# Patient Record
Sex: MALE
[De-identification: names, ages, dates, MRNs, and addresses within clinical notes are randomized per-mention and may not be internally consistent; named-entity substitution may affect disease eponyms.]

---

## 2017-10-17 ENCOUNTER — RX ONLY (OUTPATIENT)
Age: 52
Setting detail: RX ONLY
End: 2017-10-17

## 2017-10-17 ENCOUNTER — APPOINTMENT (RX ONLY)
Dept: URBAN - METROPOLITAN AREA OTHER 12 | Facility: OTHER | Age: 52
Setting detail: DERMATOLOGY
End: 2017-10-17

## 2017-10-17 DIAGNOSIS — L30.9 DERMATITIS, UNSPECIFIED: ICD-10-CM

## 2017-10-17 DIAGNOSIS — H00.01 HORDEOLUM EXTERNUM: ICD-10-CM

## 2017-10-17 PROBLEM — H00.012 HORDEOLUM EXTERNUM RIGHT LOWER EYELID: Status: ACTIVE | Noted: 2017-10-17

## 2017-10-17 PROCEDURE — ? TREATMENT REGIMEN

## 2017-10-17 PROCEDURE — ? COUNSELING

## 2017-10-17 PROCEDURE — 99202 OFFICE O/P NEW SF 15 MIN: CPT

## 2017-10-17 PROCEDURE — ? PRESCRIPTION

## 2017-10-17 RX ORDER — KETOCONAZOLE 20.5 MG/ML
SHAMPOO, SUSPENSION TOPICAL
Qty: 1 | Refills: 2 | Status: CANCELLED
Stop reason: CLARIF

## 2017-10-17 RX ORDER — MINOCYCLINE HYDROCHLORIDE 100 MG/1
CAPSULE ORAL
Qty: 84 | Refills: 0 | Status: ERX

## 2017-10-17 RX ORDER — SULFACETAMIDE SODIUM AND SULFUR 10; 5 MG/G; MG/G
RINSE TOPICAL
Qty: 1 | Refills: 5 | Status: ERX

## 2017-10-17 ASSESSMENT — LOCATION ZONE DERM
LOCATION ZONE: NECK
LOCATION ZONE: FACE
LOCATION ZONE: EYELID

## 2017-10-17 ASSESSMENT — LOCATION SIMPLE DESCRIPTION DERM
LOCATION SIMPLE: LEFT ANTERIOR NECK
LOCATION SIMPLE: RIGHT INFERIOR EYELID
LOCATION SIMPLE: RIGHT CHEEK

## 2017-10-17 ASSESSMENT — LOCATION DETAILED DESCRIPTION DERM
LOCATION DETAILED: RIGHT LATERAL INFERIOR EYELID
LOCATION DETAILED: RIGHT INFERIOR CENTRAL MALAR CHEEK
LOCATION DETAILED: LEFT SUPERIOR ANTERIOR NECK

## 2017-10-17 NOTE — HPI: RASH
How Severe Is Your Rash?: moderate
Is This A New Presentation, Or A Follow-Up?: Rash
Additional History: Patient denies any allergies or changing eating habits.

## 2017-10-17 NOTE — PROCEDURE: TREATMENT REGIMEN
HISTORY OF PRESENT ILLNESS    New patient to my practice, referred to me by his wife. Prior medical care has been from Dr. Maritza Hoff. he works as a Retired . his  past medical history was reviewed, discussed, and summarized in the list below. Reports frontal and occipital headaches for months. Left ear popping. Improves when sitting. Hypertension- took meds in the past.  Not in 3 years. Home -160/80-90  Hypertension ROS: taking medications as instructed, no medication side effects noted, no TIA's, no chest pain on exertion, no dyspnea on exertion, no swelling of ankles     reports that he has quit smoking. He does not have any smokeless tobacco history on file. reports that he drinks alcohol. BP Readings from Last 2 Encounters:   02/20/17 (!) 160/102   01/25/12 144/82       Review of Systems   All other systems reviewed and are negative, except as noted in HPI      Past Medical and Surgical History  Past Medical History   Diagnosis Date    Arthritis     Benign essential HTN      took medication    GERD (gastroesophageal reflux disease)     Hernia     Iron deficiency anemia      Dr. Ant Thomas. 2014. on Xarelto. did iron infusions.  Pulmonary emboli [facility]) 2014     Dr. Marcelino Head. Xarelto caused anemia        has a past surgical history that includes orthopaedic; colonoscopy (2014); and endoscopy (2014). Current Outpatient Prescriptions   Medication Sig    ASPIRIN (ASPIR-81 PO) Take  by mouth.  FERROUS SULFATE (IRON PO) Take  by mouth.  omeprazole (PRILOSEC OTC) 20 mg tablet Take 20 mg by mouth daily. No current facility-administered medications for this visit. reports that he has quit smoking. He does not have any smokeless tobacco history on file. He reports that he drinks alcohol.    family history includes Cancer in his brother and sister. Physical Exam   Nursing note and vitals reviewed.   Blood pressure (!) 160/102, pulse (!) 103, temperature 98.3 °F (36.8
°C), temperature source Oral, resp. rate 12, height 6' 4.5\" (1.943 m), weight 266 lb 12.8 oz (121 kg), SpO2 97 %. Constitutional: oriented to person, place, and time. No distress. Eyes: Conjunctivae are normal.   HEENT:  No cervical lymphadenopathy. No thyroid nodules or goiter  Cardiovascular: Normal rate. Regular rhythm, no murmurs, rubs. No edema  Pulmonary/Chest: Effort normal. clear to auscultation  Abdominal: soft, non-tender, non-distended  Musculoskeletal:     Neurological: Alert and oriented to person, place. Cranial nerves grossly intact. Normal gait   Skin: No rash noted. Psychiatric: Normal mood and affect. Behavior is normal.     ASSESSMENT and PLAN  Lorri Oconnell was seen today for establish care. Diagnoses and all orders for this visit:    Benign essential HTN- uncontrolled and untreated. Recommend med therapy. DASH Diet.  -     Start lisinopril (PRINIVIL, ZESTRIL) 10 mg tablet; Take 1 Tab by mouth daily.  -     CBC W/O DIFF  -     METABOLIC PANEL, COMPREHENSIVE  ACE inhibitor side effects discussed. Dry cough is common and will resolved after stopping medication. Educated of symptoms of angioedema. Stop medication immediately and seek medical attention. Neck pain - Stretching exercises demonstrated for for this condition    Frontal headache - \"pressure\". Mild. Perhaps due to HTN. Return to clinic for further evaluation if new symptoms develop or if current symptoms worsen or fail to resolve. Other orders  -     PREVNAR 13, PF, 0.5 mL syrg injection; 0.5 mL by IntraMUSCular route once for 1 dose.  PLEASE HAVE THIS AT YOUR LOCAL PHARMACY  Indications: PREVENTION OF STREPTOCOCCUS PNEUMONIAE INFECTION        lab results and schedule of future lab studies reviewed with patient  reviewed medications and side effects in detail
Detail Level: Simple
Discontinue Regimen: Topical steroid cream to face
Initiate Treatment: Ketoconazole and Minocycline

## 2018-02-05 ENCOUNTER — RX ONLY (OUTPATIENT)
Age: 53
Setting detail: RX ONLY
End: 2018-02-05

## 2018-02-05 ENCOUNTER — APPOINTMENT (RX ONLY)
Dept: URBAN - METROPOLITAN AREA OTHER 12 | Facility: OTHER | Age: 53
Setting detail: DERMATOLOGY
End: 2018-02-05

## 2018-02-05 DIAGNOSIS — L71.8 OTHER ROSACEA: ICD-10-CM

## 2018-02-05 PROCEDURE — ? PRESCRIPTION

## 2018-02-05 PROCEDURE — 99213 OFFICE O/P EST LOW 20 MIN: CPT

## 2018-02-05 PROCEDURE — ? TREATMENT REGIMEN

## 2018-02-05 PROCEDURE — ? COUNSELING

## 2018-02-05 RX ORDER — IVERMECTIN 10 MG/G
CREAM TOPICAL
Qty: 1 | Refills: 5 | Status: ERX | COMMUNITY
Start: 2018-02-05

## 2018-02-05 RX ORDER — SULFACETAMIDE SODIUM AND SULFUR 10; 5 MG/G; MG/G
RINSE TOPICAL
Qty: 1 | Refills: 5 | Status: CANCELLED
Stop reason: SDUPTHER

## 2018-02-05 RX ORDER — DOXYCYCLINE 40 MG/1
CAPSULE ORAL
Qty: 30 | Refills: 5 | Status: ERX | COMMUNITY
Start: 2018-02-05

## 2018-02-05 RX ADMIN — IVERMECTIN: 10 CREAM TOPICAL at 00:00

## 2018-02-05 RX ADMIN — DOXYCYCLINE: 40 CAPSULE ORAL at 00:00

## 2018-02-05 ASSESSMENT — LOCATION ZONE DERM: LOCATION ZONE: FACE

## 2018-02-05 ASSESSMENT — LOCATION SIMPLE DESCRIPTION DERM
LOCATION SIMPLE: LEFT CHEEK
LOCATION SIMPLE: RIGHT CHEEK

## 2018-02-05 ASSESSMENT — LOCATION DETAILED DESCRIPTION DERM
LOCATION DETAILED: RIGHT CENTRAL MALAR CHEEK
LOCATION DETAILED: LEFT INFERIOR CENTRAL MALAR CHEEK

## 2018-02-05 NOTE — HPI: RASH
How Severe Is Your Rash?: mild
Is This A New Presentation, Or A Follow-Up?: Follow Up Rash
Additional History: The patient states the rash did not clear up minocycline and sulfur wash. He does not get flushing or gritty eyes regularly.

## 2018-02-05 NOTE — PROCEDURE: TREATMENT REGIMEN
Initiate Treatment: Soolantra 1%: apply to face once daily\\ndoxycycline 40 mg QD
Continue Regimen: Sulfacetamide sulfur wash QD
Detail Level: Zone

## 2018-06-08 ENCOUNTER — APPOINTMENT (RX ONLY)
Dept: URBAN - METROPOLITAN AREA OTHER 12 | Facility: OTHER | Age: 53
Setting detail: DERMATOLOGY
End: 2018-06-08

## 2018-06-08 DIAGNOSIS — L71.8 OTHER ROSACEA: ICD-10-CM

## 2018-06-08 PROCEDURE — 99212 OFFICE O/P EST SF 10 MIN: CPT

## 2018-06-08 PROCEDURE — ? PRESCRIPTION

## 2018-06-08 PROCEDURE — ? COUNSELING

## 2018-06-08 PROCEDURE — ? TREATMENT REGIMEN

## 2018-06-08 RX ORDER — MINOCYCLINE HYDROCHLORIDE 90 MG/1
CAPSULE, EXTENDED RELEASE ORAL
Qty: 30 | Refills: 1 | Status: ERX | COMMUNITY
Start: 2018-06-08

## 2018-06-08 RX ADMIN — MINOCYCLINE HYDROCHLORIDE: 90 CAPSULE, EXTENDED RELEASE ORAL at 00:00

## 2018-06-08 ASSESSMENT — LOCATION DETAILED DESCRIPTION DERM
LOCATION DETAILED: LEFT CENTRAL MALAR CHEEK
LOCATION DETAILED: RIGHT MEDIAL MALAR CHEEK

## 2018-06-08 ASSESSMENT — LOCATION SIMPLE DESCRIPTION DERM
LOCATION SIMPLE: RIGHT CHEEK
LOCATION SIMPLE: LEFT CHEEK

## 2018-06-08 ASSESSMENT — LOCATION ZONE DERM: LOCATION ZONE: FACE

## 2018-06-08 NOTE — PROCEDURE: TREATMENT REGIMEN
Detail Level: Simple
Continue Regimen: Soolantra cream daily
Otc Regimen: Cerave cleanser  or plain dove soap twice daily
Discontinue Regimen: Rosacea 40mg and all Loofahs
Plan: Discussed isotretinoin, Minocycline and doxycycline- consider starting Accutane at follow up if no improvement with Minocycline.\\n\\nGenRx information provided to patient

## 2018-08-09 ENCOUNTER — APPOINTMENT (RX ONLY)
Dept: URBAN - METROPOLITAN AREA OTHER 12 | Facility: OTHER | Age: 53
Setting detail: DERMATOLOGY
End: 2018-08-09

## 2018-08-09 DIAGNOSIS — L70.8 OTHER ACNE: ICD-10-CM

## 2018-08-09 DIAGNOSIS — L71.8 OTHER ROSACEA: ICD-10-CM

## 2018-08-09 PROCEDURE — ? COUNSELING

## 2018-08-09 PROCEDURE — ? TREATMENT REGIMEN

## 2018-08-09 PROCEDURE — ? PRESCRIPTION

## 2018-08-09 PROCEDURE — 99212 OFFICE O/P EST SF 10 MIN: CPT

## 2018-08-09 RX ORDER — IVERMECTIN 10 MG/G
CREAM TOPICAL
Qty: 1 | Refills: 5 | Status: ERX

## 2018-08-09 ASSESSMENT — LOCATION SIMPLE DESCRIPTION DERM
LOCATION SIMPLE: LEFT UPPER BACK
LOCATION SIMPLE: RIGHT UPPER BACK

## 2018-08-09 ASSESSMENT — LOCATION ZONE DERM: LOCATION ZONE: TRUNK

## 2018-08-09 ASSESSMENT — LOCATION DETAILED DESCRIPTION DERM
LOCATION DETAILED: LEFT SUPERIOR LATERAL UPPER BACK
LOCATION DETAILED: RIGHT SUPERIOR LATERAL UPPER BACK

## 2018-08-09 NOTE — PROCEDURE: TREATMENT REGIMEN
Discontinue Regimen: Minocycline 90mg ER, consider re-starting if flare ups occur
Detail Level: Zone
Continue Regimen: Soolantra once daily for maintenance
Samples Given: Soolantra co-pay card given